# Patient Record
Sex: FEMALE | Race: WHITE
[De-identification: names, ages, dates, MRNs, and addresses within clinical notes are randomized per-mention and may not be internally consistent; named-entity substitution may affect disease eponyms.]

---

## 2018-09-18 ENCOUNTER — HOSPITAL ENCOUNTER (OUTPATIENT)
Dept: HOSPITAL 31 - C.SDS | Age: 23
Discharge: HOME | End: 2018-09-18
Attending: OBSTETRICS & GYNECOLOGY
Payer: SELF-PAY

## 2018-09-18 VITALS — OXYGEN SATURATION: 100 %

## 2018-09-18 VITALS — SYSTOLIC BLOOD PRESSURE: 112 MMHG | TEMPERATURE: 98 F | DIASTOLIC BLOOD PRESSURE: 60 MMHG | HEART RATE: 82 BPM

## 2018-09-18 VITALS — RESPIRATION RATE: 16 BRPM

## 2018-09-18 DIAGNOSIS — N90.7: ICD-10-CM

## 2018-09-18 DIAGNOSIS — L72.0: Primary | ICD-10-CM

## 2018-09-18 PROCEDURE — 11423 EXC H-F-NK-SP B9+MARG 2.1-3: CPT

## 2018-09-18 PROCEDURE — 84702 CHORIONIC GONADOTROPIN TEST: CPT

## 2018-09-18 PROCEDURE — 36415 COLL VENOUS BLD VENIPUNCTURE: CPT

## 2018-09-18 PROCEDURE — 88305 TISSUE EXAM BY PATHOLOGIST: CPT

## 2018-09-18 RX ADMIN — HYDROMORPHONE HYDROCHLORIDE PRN MG: 1 INJECTION, SOLUTION INTRAMUSCULAR; INTRAVENOUS; SUBCUTANEOUS at 11:38

## 2018-09-18 RX ADMIN — HYDROMORPHONE HYDROCHLORIDE PRN MG: 1 INJECTION, SOLUTION INTRAMUSCULAR; INTRAVENOUS; SUBCUTANEOUS at 11:07

## 2018-09-18 RX ADMIN — HYDROMORPHONE HYDROCHLORIDE PRN MG: 1 INJECTION, SOLUTION INTRAMUSCULAR; INTRAVENOUS; SUBCUTANEOUS at 11:24

## 2018-09-18 NOTE — [id number]
Surgeon's Initial Post Op Note





- Surgeon's Notes


Surgeon: Dr. Denisse Arteaga


Assistant: none


Type of Anesthesia: General LMA


Pre-Operative Diagnosis: perineal pain, right labia minora cyst


Operative Findings: 5cm right labial sebaceous cyst, clitoris surgically absent 

from female circumcision


Post-Operative Diagnosis: same


Operation Performed: right labial cyst removal


Specimen/Specimens Removed: right labial cyst and excess skin


Estimated Blood Loss: EBL {In ML}: 200


Blood Products Given: N/A


Drains Used: No Drains


Post-Op Condition: Good


Date of Surgery/Procedure: 09/18/18


Time of Surgery/Procedure: 09:45

## 2018-09-21 NOTE — OP
PROCEDURE DATE:  09/18/2018



PREOPERATIVE DIAGNOSES:  Perineal pain, right labia minora cyst status post

female circumcision.



POSTOPERATIVE DIAGNOSES:  Perineal pain, right labia minora cyst status

post female circumcision.



INTRAOPERATIVE FINDINGS:  A 5 cm right labial sebaceous cyst, clitoris

surgically absent from female circumcision.



PROCEDURE:  Right labial cyst removal.



SURGEON:  Denisse Arteaga MD



ASSISTANT:  None.



TYPE OF ANESTHESIA:  General LMA.



SPECIMEN:  Right labial cyst and excess labial skin.



ESTIMATED BLOOD LOSS:  200 mL.



BLOOD PRODUCTS:  None.



DRAINS:  No drains used.



POSTOPERATIVE CONDITION:  Stable.



ADDITIONAL COMMENTS:  All lap counts and instrument counts were correct x2.



DESCRIPTION OF PROCEDURE:  After all relevant documentation was reviewed

and signed by MD, the patient was taken back to the OR room.  She was

prepped and draped in the usual sterile fashion after being placed in

lithotomy position and under general anesthesia.  Attention, was then

placed to the labia minora where local anesthesia was injected and a

vertical skin incision was then made with the use of a scalpel.  The

incision was then extended superiorly and inferiorly.  The cyst capsule was

then gently dissected off the vaginal epithelium until the entirety of the

cyst was then removed.  The remaining empty space was then reapproximated

with the use of 2-0 Vicryl in a running locked fashion for excellent

hemostasis.  The excess skin was then removed and the skin was then

reapproximated with the use of 4-0 Monocryl in a subcuticular fashion. 

Once again excellent hemostasis was noted.  The patient was then awoken

from general anesthesia and taken back to the recovery room in stable

condition.







__________________________________________

Denisse Arteaga MD





DD:  09/20/2018 14:25:19

DT:  09/20/2018 21:59:31

Job # [id number]